# Patient Record
Sex: FEMALE | Race: OTHER
[De-identification: names, ages, dates, MRNs, and addresses within clinical notes are randomized per-mention and may not be internally consistent; named-entity substitution may affect disease eponyms.]

---

## 2017-08-31 ENCOUNTER — HOSPITAL ENCOUNTER (OUTPATIENT)
Dept: HOSPITAL 62 - RAD | Age: 32
End: 2017-08-31
Attending: NURSE PRACTITIONER
Payer: SELF-PAY

## 2017-08-31 DIAGNOSIS — Z34.83: Primary | ICD-10-CM

## 2017-08-31 PROCEDURE — 76805 OB US >/= 14 WKS SNGL FETUS: CPT

## 2017-08-31 NOTE — RADIOLOGY REPORT (SQ)
EXAM DESCRIPTION:  U/S OB 14+ TRNABD 1GES W/O DOP



COMPLETED DATE/TIME:  8/31/2017 2:35 pm



REASON FOR STUDY:  ENCOUNTER FOR SUPERVISION OF OTHER NORMAL PREGNANCY, SECOND TRIMESTER Z34.82  ENCO
UNTER FOR SUPRVSN OF NORMAL PREGNANCY, SECOND TRI



COMPARISON:  None.



TECHNIQUE:  Static and Dynamic grayscale imaging performed of gravid uterus using transabdominal appr
oach.  Additional selected color Doppler and spectral images recorded.  All stored on PACS.



LIMITATIONS:  None.



FINDINGS:  EGA:  29 weeks 48 weeks

NOAH: 12/25/2017

EFW:  1415+/- 209 grams

PERCENTILE:  Not calculated

VARGAS: 13.6 cm

PLACENTA:  Anterior  GRADE: I

FETAL PRESENTATION: Cephalic.

FETAL ANATOMY:

FETAL HEART RATE: 147 beats per minute.

FOUR CHAMBER HEART: Visualized.

THREE VESSEL CORD: Yes.

CORD INSERTION: Visualized.

KIDNEYS AND BLADDER: Renal pelves appear prominent, 1 greater than the other.

STOMACH: Visualized. Appears normal.

SPINE: Normal as visualized.

BRAIN AND LATERAL VENTRICLES: Lateral ventricles not seen. normal brain as visualized.

OTHER: No other significant finding.

MATERNAL ADNEXA: Maternal ovaries not visualized.

CERVICAL LENGTH: Not applicable. Greater than 20 weeks. Need transvaginal study if indicated.   Close
d.

OTHER: No other significant finding.



IMPRESSION:  1.  Live intrauterine gestation 29 weeks 4 days with an estimated date of delivery of 12
/25/2017.

2.  The renal pelves are prominent, 1 more so than the other.

Trimester of pregnancy: Third trimester - 28 weeks to delivery.



TECHNICAL DOCUMENTATION:  JOB ID:  [id number]

 2011 [employer]- All Rights Reserved

## 2017-11-16 ENCOUNTER — HOSPITAL ENCOUNTER (INPATIENT)
Dept: HOSPITAL 62 - LC | Age: 32
LOS: 2 days | Discharge: HOME | End: 2017-11-18
Attending: OBSTETRICS & GYNECOLOGY | Admitting: OBSTETRICS & GYNECOLOGY
Payer: MEDICAID

## 2017-11-16 DIAGNOSIS — D64.9: ICD-10-CM

## 2017-11-16 DIAGNOSIS — Z3A.40: ICD-10-CM

## 2017-11-16 DIAGNOSIS — O32.6XX0: ICD-10-CM

## 2017-11-16 DIAGNOSIS — J45.909: ICD-10-CM

## 2017-11-16 LAB
A1 MICROGLOB PLACENTAL VAG QL: POSITIVE
APPEARANCE UR: CLEAR
BARBITURATES UR QL SCN: NEGATIVE
BASOPHILS # BLD AUTO: 0.1 10^3/UL (ref 0–0.2)
BASOPHILS NFR BLD AUTO: 0.6 % (ref 0–2)
BILIRUB UR QL STRIP: NEGATIVE
EOSINOPHIL # BLD AUTO: 0.5 10^3/UL (ref 0–0.6)
EOSINOPHIL NFR BLD AUTO: 4.3 % (ref 0–6)
ERYTHROCYTE [DISTWIDTH] IN BLOOD BY AUTOMATED COUNT: 14.4 % (ref 11.5–14)
GLUCOSE UR STRIP-MCNC: NEGATIVE MG/DL
HCT VFR BLD CALC: 29 % (ref 36–47)
HGB BLD-MCNC: 9.4 G/DL (ref 12–15.5)
HGB HCT DIFFERENCE: -0.8
KETONES UR STRIP-MCNC: NEGATIVE MG/DL
LYMPHOCYTES # BLD AUTO: 1.7 10^3/UL (ref 0.5–4.7)
LYMPHOCYTES NFR BLD AUTO: 15.4 % (ref 13–45)
MCH RBC QN AUTO: 24.7 PG (ref 27–33.4)
MCHC RBC AUTO-ENTMCNC: 32.4 G/DL (ref 32–36)
MCV RBC AUTO: 76 FL (ref 80–97)
METHADONE UR QL SCN: NEGATIVE
MONOCYTES # BLD AUTO: 1 10^3/UL (ref 0.1–1.4)
MONOCYTES NFR BLD AUTO: 8.5 % (ref 3–13)
NEUTROPHILS # BLD AUTO: 8 10^3/UL (ref 1.7–8.2)
NEUTS SEG NFR BLD AUTO: 71.2 % (ref 42–78)
NITRITE UR QL STRIP: NEGATIVE
PCP UR QL SCN: NEGATIVE
PH UR STRIP: 6 [PH] (ref 5–9)
PROT UR STRIP-MCNC: NEGATIVE MG/DL
RBC # BLD AUTO: 3.81 10^6/UL (ref 3.72–5.28)
SP GR UR STRIP: 1.03
URINE OPIATES LOW: NEGATIVE
UROBILINOGEN UR-MCNC: NEGATIVE MG/DL (ref ?–2)
WBC # BLD AUTO: 11.2 10^3/UL (ref 4–10.5)

## 2017-11-16 PROCEDURE — 86901 BLOOD TYPING SEROLOGIC RH(D): CPT

## 2017-11-16 PROCEDURE — 4A1HXCZ MONITORING OF PRODUCTS OF CONCEPTION, CARDIAC RATE, EXTERNAL APPROACH: ICD-10-PCS | Performed by: ADVANCED PRACTICE MIDWIFE

## 2017-11-16 PROCEDURE — 86592 SYPHILIS TEST NON-TREP QUAL: CPT

## 2017-11-16 PROCEDURE — 85025 COMPLETE CBC W/AUTO DIFF WBC: CPT

## 2017-11-16 PROCEDURE — 86850 RBC ANTIBODY SCREEN: CPT

## 2017-11-16 PROCEDURE — 85027 COMPLETE CBC AUTOMATED: CPT

## 2017-11-16 PROCEDURE — 36415 COLL VENOUS BLD VENIPUNCTURE: CPT

## 2017-11-16 PROCEDURE — 81001 URINALYSIS AUTO W/SCOPE: CPT

## 2017-11-16 PROCEDURE — 86900 BLOOD TYPING SEROLOGIC ABO: CPT

## 2017-11-16 PROCEDURE — 84112 EVAL AMNIOTIC FLUID PROTEIN: CPT

## 2017-11-16 PROCEDURE — 80307 DRUG TEST PRSMV CHEM ANLYZR: CPT

## 2017-11-16 RX ADMIN — PENICILLIN G POTASSIUM SCH UNIT: 5000000 POWDER, FOR SOLUTION INTRAMUSCULAR; INTRAPLEURAL; INTRATHECAL; INTRAVENOUS at 16:16

## 2017-11-16 RX ADMIN — FERROUS SULFATE TAB 325 MG (65 MG ELEMENTAL FE) SCH MG: 325 (65 FE) TAB at 17:38

## 2017-11-16 RX ADMIN — IBUPROFEN SCH MG: 800 TABLET, FILM COATED ORAL at 22:10

## 2017-11-16 RX ADMIN — PENICILLIN G POTASSIUM SCH UNIT: 5000000 POWDER, FOR SOLUTION INTRAMUSCULAR; INTRAPLEURAL; INTRATHECAL; INTRAVENOUS at 17:07

## 2017-11-16 RX ADMIN — FAMOTIDINE SCH MG: 20 TABLET, FILM COATED ORAL at 22:09

## 2017-11-16 RX ADMIN — IBUPROFEN SCH MG: 800 TABLET, FILM COATED ORAL at 15:21

## 2017-11-16 RX ADMIN — DOCUSATE SODIUM SCH MG: 100 CAPSULE, LIQUID FILLED ORAL at 17:38

## 2017-11-16 NOTE — ADMISSION PHYSICAL
=================================================================



=================================================================

Datetime Report Generated by CPN: 2017 15:37

   

   

=================================================================

CURRENT ADMISSION

=================================================================

   

Chief Complaint:  Suspected Ruptured Membranes

Indication for Induction:  PROM

Indication for Induction:  Term, Intrauterine Pregnancy; Ruptured

   Membranes

Admit Plan:  Admit to Unit; Initiate Labor Induction Protocol

   

=================================================================

ALLERGIES

=================================================================

   

Medication Allergies:  No

Medication Allergies:  No Known Allergies (2017)

Medication Allergies:  No Known Allergies (2014)

Latex:  No Latex Allergies

Food Allergies:  N/A

   

=================================================================

OBSTETRICAL HISTORY

=================================================================

   

EDC:  2017 00:00

:  4

Para:  2

Term:  2

:  0

SAB:  1

IAB:  0

Ectopic:  0

Livin

Cesareans:  0

VBACs:  0

Multiple Births:  1

Gestational Diabetes:  No

Rh Sensitization:  No

Incompetent Cervix:  No

RAMONA:  No

Infertility:  No

ART Treatment:  No

Uterine Anomaly:  No

IUGR:  No

Hx Previous C/S:  No

Macrosomia:  No

Hx Loss/Stillborn:  No

PIH:  No

Hx  Death:  No

Placenta Previa/Abruption:  No

Depression/PP Depression:  No

PTL/PROM:  No

Post Partum Hemorrhage:  No

Current Pregnancy Procedures:  Ultrasound; NST

Obstetrical History Comments:  G1- 40.6-female-7lbs 6 oz

   G2-SAB around 10 weeks

   G3-Induced at 41.1-female-6lbs 14oz

   

=================================================================

***SEE PRENATAL RECORDS***

=================================================================

   

Alcohol:  No

Marijuana :  No

Cocaine:  No

Other Illicit Drugs:  No

Cigarettes:  Never Smoker. [phone]

   

=================================================================

MEDICAL HISTORY

=================================================================

   

Diabetes:  No

Blood Transfusion:  No

Pulmonary Disease (Asthma, TB):  Yes

Breast Disease:  No

Hypertension:  No

Gyn Surgery:  No

Heart Disease:  No

Hosp/Surgery:  No

Autoimmune Disorder:  No

Anesthetic Complications:  No

Kidney Disease:  No

Abnormal Pap Smear:  Yes

Neuro/Epilepsy:  No

Psychiatric Disorders:  No

Other Medical Diseases:  No

Hepatitis/Liver Disease:  No

Significant Family History:  No

Varicosities/Phlebitis:  No

Trauma/Violence :  No

Thyroid Dysfunction:  No

Medical History Comments:  childhood asthma

   

=================================================================

INFECTIOUS HISTORY

=================================================================

   

Gonorrhea:  No

Genital Herpes:  No

Chlamydia:  No

Tuberculosis:  No

Syphilis:  No

Hepatitis:  No

HIV/AIDS Exposure:  No

Rash or Viral Illness:  No

HPV:  Yes

   

=================================================================

PHYSICAL EXAM

=================================================================

   

General:  Normal

HEENT:  Normal

Neurologic:  Normal

Thyroid:  Normal

Heart:  Normal

Lungs:  Normal

Breast:  Deferred

Back:  Normal

Abdomen:  Normal

Genitourinary Exam:  Normal

Extremities:  Normal

DTRs:  Normal

Pelvic Type:  Adequate

Vital Signs:  Reviewed

   

=================================================================

VAGINAL EXAM

=================================================================

   

Dilatation:  2

Effacement:  60

Station:  -2

   

=================================================================

MEMBRANES

=================================================================

   

Pooling:  Positive

Membranes:  Ruptured

   

=================================================================

FETUS A

=================================================================

   

EGA:  40.4

Monitoring:  External US

FHR- Baseline:  120

Variability:  Moderate 6-25bpm

Accelerations:  15X15

Decelerations:  None

FHR Category:  Category I

Admit Comment:  Admit for pitocin

   

=================================================================

PLANS FOR LABOR AND DELIVERY

=================================================================

   

Labor and Delivery:  None

Pain Management:  None

Feeding Preference:  Breast

Benefit of Breast Feed Discussed:  Yes

Circumcision:  Yes

   

=================================================================

INFORMED CONSENT

=================================================================

   

Signature:  Electronically signed by MD Mita RiveraSMIDA) on

   2017 at 03:44  with User ID: [email or web address]

## 2017-11-17 LAB
ERYTHROCYTE [DISTWIDTH] IN BLOOD BY AUTOMATED COUNT: 14.8 % (ref 11.5–14)
HCT VFR BLD CALC: 28.4 % (ref 36–47)
HGB BLD-MCNC: 9.3 G/DL (ref 12–15.5)
HGB HCT DIFFERENCE: -0.5
MCH RBC QN AUTO: 25.2 PG (ref 27–33.4)
MCHC RBC AUTO-ENTMCNC: 32.9 G/DL (ref 32–36)
MCV RBC AUTO: 77 FL (ref 80–97)
RBC # BLD AUTO: 3.7 10^6/UL (ref 3.72–5.28)
WBC # BLD AUTO: 13.2 10^3/UL (ref 4–10.5)

## 2017-11-17 RX ADMIN — IBUPROFEN SCH MG: 800 TABLET, FILM COATED ORAL at 22:44

## 2017-11-17 RX ADMIN — Medication SCH CAP: at 10:21

## 2017-11-17 RX ADMIN — DOCUSATE SODIUM SCH MG: 100 CAPSULE, LIQUID FILLED ORAL at 17:58

## 2017-11-17 RX ADMIN — PENICILLIN G POTASSIUM SCH UNIT: 5000000 POWDER, FOR SOLUTION INTRAMUSCULAR; INTRAPLEURAL; INTRATHECAL; INTRAVENOUS at 00:44

## 2017-11-17 RX ADMIN — FERROUS SULFATE TAB 325 MG (65 MG ELEMENTAL FE) SCH MG: 325 (65 FE) TAB at 10:20

## 2017-11-17 RX ADMIN — IBUPROFEN SCH MG: 800 TABLET, FILM COATED ORAL at 05:07

## 2017-11-17 RX ADMIN — FERROUS SULFATE TAB 325 MG (65 MG ELEMENTAL FE) SCH MG: 325 (65 FE) TAB at 17:58

## 2017-11-17 RX ADMIN — DOCUSATE SODIUM SCH MG: 100 CAPSULE, LIQUID FILLED ORAL at 10:20

## 2017-11-17 RX ADMIN — FAMOTIDINE SCH MG: 20 TABLET, FILM COATED ORAL at 22:44

## 2017-11-17 RX ADMIN — IBUPROFEN SCH MG: 800 TABLET, FILM COATED ORAL at 13:43

## 2017-11-17 RX ADMIN — FAMOTIDINE SCH MG: 20 TABLET, FILM COATED ORAL at 10:21

## 2017-11-17 RX ADMIN — SENNOSIDES, DOCUSATE SODIUM SCH EACH: 50; 8.6 TABLET, FILM COATED ORAL at 10:21

## 2017-11-17 NOTE — DELIVERY SUMMARY
=================================================================

Del Sum A-C

=================================================================

Datetime Report Generated by CPN: 2017 14:15

   

   

=================================================================

DELIVERY PERSONNEL

=================================================================

   

DELIVERY PERSONNEL:  [id number]

Delivery Doctor::  Zuleyka Mares CNM

Nurse Midwife Certified::  Zuleyka Mares CNM

Labor and Delivery Nurse::  Jana Altamirano RN

Labor and Delivery Nurse::  Lissa Leach RN

Circulator::  SUSAN Caraballo

Nursery Nurse::  MELODY Cardenas RN

Nursery Nurse::  SATISH Hassan RN

Scrub Tech/CNA:  Delores Varner, CNA II

   

=================================================================

MATERNAL INFORMATION

=================================================================

   

Delivery Anesthesia:  Epidural

Medications After Delivery:  Pitocin Drip 20 Units/1000ml NSS

Estimated  Blood Loss (ml):  100

Maternal Complications:  None

Provider Comments:  Called into room pt had been pushing with rn and

   ready for delivery. VMI delivered with right compound hand.

   Spontaneous respiratory effort and cry at birth. Baby placed on

   maternal abdomen, cord allowed to stop pulsating then clamped x2 and

   cut (3vc noted). Placenta delivered spontaneously intact, vaginal

   and perineal inspections revealed no lacerations. Fundus firm @ U-2,

   bleeding stable. Mother and baby in room stable and remain skin to

   skin.

   

=================================================================

LABOR SUMMARY

=================================================================

   

EDC:  2017 00:00

No. Babies in Womb:  1

 Attempted:  No

Labor Anesthesia:  Epidural

   

=================================================================

LABOR INFORMATION

=================================================================

   

Reason for Induction:  Premature Rupture of Membranes

Onset of Labor:  2017 08:30

Complete Dilatation:  2017 13:04

Oxytocin:  Augmentation

Group B Beta Strep:  positive

Antibiotics # of Doses:  3

Antibiotics Time of Last Dose:  1018

Steroids Given:  None

Reason Steroids Not Administered:  Not Applicable

   

=================================================================

MEMBRANES

=================================================================

   

Membranes Rupture Method:  Spontaneous

Rupture of Membranes:  11/15/2017 22:00

Length of Rupture (hr):  15.40

Amniotic Fluid Color:  Clear

Amniotic Fluid Amount:  Scant

Amniotic Fluid Odor:  None

   

=================================================================

STAGES OF LABOR

=================================================================

   

Stage 1 hr:  4

Stage 1 min:  34

Stage 2 hr:  0

Stage 2 min:  20

Stage 3 hr:  0

Stage 3 min:  6

Total Time in Labor hr:  5

Total Time in Labor min:  0

   

=================================================================

VAGINAL DELIVERY

=================================================================

   

Episiotomy:  None

Laceration #1:  None

Laceration Extension #1:  N/A

Laceration #2:  None

Laceration #3:  None

Laceration Repair:  Not Applicable

Laceration Repair Note:  perineal abrasion-hemostatic no repair needed.

Sponge Count Correct:  N/A

   

=================================================================

CSECTION DELIVERY

=================================================================

   

Primary Indication:  N/A

Secondary Indication:  N/A

CSection Incidence:  N/A

Labor:  N/A

Elective:  N/A

CSection Incision:  N/A

   

=================================================================

BABY A INFORMATION

=================================================================

   

Infant Delivery Date/Time:  2017 13:24

Method of Delivery:  Vaginal

Born in Route :  No

:  N/A

Forceps:  N/A

Vacuum Extraction:  N/A

Shoulder Dystocia :  No

   

=================================================================

PRESENTATION/POSITION BABY A

=================================================================

   

Presentation:  Cephalic

Cephalic Presentation:  Vertex

Vertex Position:  Right Occipital Anterior

Breech Presentation:  N/A

   

=================================================================

PLACENTA INFORMATION BABY A

=================================================================

   

Placenta Delivery Time :  2017 13:30

Placenta Method of Delivery:  Spontaneous

Placenta Status:  Delivered

   

=================================================================

APGAR SCORES BABY A

=================================================================

   

Heart Rate 1 min:  >100 bpm

Resp Effort 1 min:  Good Cry

Reflex Irritability 1 min:  Cough or Sneeze or Pulls Away

Muscle Tone 1 min:  Active Motion

Color 1 min:  Body Pink, Extremities Blue

Resuscitation Effort 1 min:  Tactile Stimulation

APGAR SCORE 1 MIN:  9

Heart Rate 5 min:  >100 bpm

Resp Effort 5 min:  Good Cry

Reflex Irritability 5 min:  Cough or Sneeze or Pulls Away

Muscle Tone 5 min:  Active Motion

Color 5 min:  Body Pink, Extremities Blue

Resuscitation Effort 5 min:  Tactile Stimulation

APGAR SCORE 5 MIN:  9

   

=================================================================

INFANT INFORMATION BABY A

=================================================================

   

Gestational Age at Delivery:  40.4

Gestational Status:  Full Term- 39- 40.6 Weeks

Infant Outcome :  Liveborn

Infant Condition :  Stable

Infant Sex:  Male

   

=================================================================

IDENTIFICATION BABY A

=================================================================

   

Infant Verification Date/Time:  2017 13:50

ID Band Number:  [id number]

Mother's Name Verified:  Yes

Infant Medical Record Number:  [id number]

RN Verifying Infant:  MARQUITA Leach RN

Additional Verifying Personnel:  COOPER Altamirano RN

   

=================================================================

WEIGHT/LENGTH BABY A

=================================================================

   

Infant Birthweight (gm):  3480

Infant Weight (lb):  7

Infant Weight (oz):  11

Infant Length (in):  19.25

Infant Length (cm):  48.90

   

=================================================================

CORD INFORMATION BABY A

=================================================================

   

No. Cord Vessels:  3

Nuchal Cord :  N/A

Cord Blood Taken:  Yes-For Storage (Mom's Blood type +)

Infant Suction:  Mouth; Nose

   

=================================================================

ASSESSMENT BABY A

=================================================================

   

Infant Complications:  None

Physical Findings at Delivery:  Within Normal Limits

Physical Findings- Other:  Right compound hand

Infant Respirations:  Appears Normal

Skin to Skin:  Yes

Skin to Skin Time (min):  30

Neonatologist/ALS Called :  No

Infant Care By:  COOPER Cardenas RN

Transferred To:  Remains with Mother

   

=================================================================

BABY B INFORMATION

=================================================================

   

 :  N/A

   

=================================================================

SIGNATURES

=================================================================

   

Assignment:  Dominique Lantigua MD

Signature:  Electronically signed by Zuleyka Mares CNM on

   2017 at 13:40  with User ID: [email or web address]

:  Electronically signed by Zuleyka Mares CNM on 2017 at

   13:40  with User ID: [email or web address]

## 2017-11-17 NOTE — WARNING SIGNS IN BABIES
=================================================================

VOD Warning Signs

=================================================================

Datetime Report Generated by [facility]: 11/17/2017 14:17

   

VOD#608 -Warning Signs in Babies:  Needs to be viewed.    (11/17/2017

   14:14:Ana Lilia Nieves)

## 2017-11-17 NOTE — PDOC PROGRESS REPORT
Subjective-OB


Subjective: 


Post Delivery Day: 1








32 year old.  Denies any needs at this time, voiding without difficulty, 

tolerating diet, pain is well controlled.   








Physical Exam (OB)


Vital Signs: 


 











Temp Pulse Resp BP Pulse Ox


 


 97.8 F   84   18   103/65   100 


 


 11/17/17 08:00  11/17/17 08:00  11/17/17 08:00  11/17/17 08:00  11/17/17 08:00








 Intake & Output











 11/16/17 11/17/17 11/18/17





 06:59 06:59 06:59


 


Weight 56.7 kg  














- PIH/Pre-Eclampsia


DTR's: 1 +


Clonus: Negative


Headache: Absent


Epigastric Pain: No


Visual Changes: No





- Lochia


Lochia Amount: Scant < 10 ml


Lochia Color: Rubra/Red





- Abdomen


Description: Soft, Round


Hernia Present: No


Fundal Description: Firm, Midline


Fundal Height: u/u - u/2





Objective-Diagnostic


Laboratory: 


 





 11/17/17 07:29 





 











  11/17/17





  07:29


 


WBC  13.2 H


 


RBC  3.70 L


 


Hgb  9.3 L


 


Hct  28.4 L


 


MCV  77 L


 


MCH  25.2 L


 


MCHC  32.9


 


RDW  14.8 H


 


Plt Count  228














Assessment and Plan(PN)





- Assessment and Plan


(1) Delivery normal


Is this a current diagnosis for this admission?: Yes   


Plan: 


routine pp care








(2) Positive GBS test


Is this a current diagnosis for this admission?: Yes   


Plan: 


abx maternal temp








- Time Spent with Patient


Time with patient: Less than 15 minutes


Critical Time spent with patient: Less than 15 minutes


Medications reviewed and adjusted accordingly: Yes





- Disposition


Anticipated Discharge: Home


Within: within 24 hours

## 2017-11-18 VITALS — SYSTOLIC BLOOD PRESSURE: 103 MMHG | DIASTOLIC BLOOD PRESSURE: 65 MMHG

## 2017-11-18 RX ADMIN — SENNOSIDES, DOCUSATE SODIUM SCH EACH: 50; 8.6 TABLET, FILM COATED ORAL at 10:07

## 2017-11-18 RX ADMIN — FAMOTIDINE SCH MG: 20 TABLET, FILM COATED ORAL at 10:07

## 2017-11-18 RX ADMIN — FERROUS SULFATE TAB 325 MG (65 MG ELEMENTAL FE) SCH MG: 325 (65 FE) TAB at 10:07

## 2017-11-18 RX ADMIN — DOCUSATE SODIUM SCH MG: 100 CAPSULE, LIQUID FILLED ORAL at 10:07

## 2017-11-18 RX ADMIN — IBUPROFEN SCH MG: 800 TABLET, FILM COATED ORAL at 06:34

## 2017-11-18 RX ADMIN — Medication SCH CAP: at 10:07

## 2017-11-18 NOTE — PDOC DISCHARGE SUMMARY
Final Diagnosis


Discharge Date: 11/18/17





- Final Diagnosis


(1) Delivery normal


Is this a current diagnosis for this admission?: Yes   





(2) Positive GBS test


Is this a current diagnosis for this admission?: Yes   





(3) Anemia


Is this a current diagnosis for this admission?: Yes   





Discharge Data





- Discharge Medication


Home Medications: 








Pnv,Calcium 72/Iron/Folic Acid [Pnv Prenatal Plus Multivit Tab] 1 each PO DAILY 

02/21/16 


Docusate Sodium [Colace 100 mg Capsule] 100 mg PO BID #60 capsule 11/18/17 


Ferrous Sulfate [Feosol 325 mg Tablet] 325 mg PO BID #60 tablet 11/18/17 


Ibuprofen [Motrin 800 mg Tablet] 800 mg PO Q8 #60 tablet 11/18/17 








Gestational Age: 40.4


Reason(s) for Admission: Onset of Labor, Group B Strep Positive


Prenatal Procedures: NST


Intrapartum Procedure(s): Spontaneous Vaginal Delivery





- Diagnosis Test


Laboratory: 


 











Temp Pulse Resp BP Pulse Ox


 


 98.5 F   82   15   113/62   100 


 


 11/18/17 07:38  11/18/17 07:38  11/18/17 07:38  11/18/17 07:38  11/18/17 07:38








 











  11/16/17 11/16/17 11/17/17





  00:52 02:12 07:29


 


RBC   3.81  3.70 L


 


Hgb   9.4 L  9.3 L


 


Hct   29.0 L  28.4 L


 


Urine Opiates Screen  NEGATIVE  














- Discharge information/Instructions


Discharge Activity: Activity As Tolerated, Pelvic Rest, No tub bath


Discharge Diet: Regular


Disposition: HOME, SELF-CARE


Follow up with: [facility]


in: 4, Weeks

## 2019-07-19 ENCOUNTER — HOSPITAL ENCOUNTER (INPATIENT)
Dept: HOSPITAL 62 - LR | Age: 34
LOS: 2 days | Discharge: HOME | End: 2019-07-21
Attending: OBSTETRICS & GYNECOLOGY | Admitting: OBSTETRICS & GYNECOLOGY
Payer: SELF-PAY

## 2019-07-19 DIAGNOSIS — Z3A.41: ICD-10-CM

## 2019-07-19 DIAGNOSIS — O48.0: Primary | ICD-10-CM

## 2019-07-19 DIAGNOSIS — D50.9: ICD-10-CM

## 2019-07-19 DIAGNOSIS — D25.9: ICD-10-CM

## 2019-07-19 DIAGNOSIS — O34.13: ICD-10-CM

## 2019-07-19 LAB
ADD MANUAL DIFF: NO
BARBITURATES UR QL SCN: NEGATIVE
BASOPHILS # BLD AUTO: 0.1 10^3/UL (ref 0–0.2)
BASOPHILS NFR BLD AUTO: 0.8 % (ref 0–2)
EOSINOPHIL # BLD AUTO: 0.2 10^3/UL (ref 0–0.6)
EOSINOPHIL NFR BLD AUTO: 2.5 % (ref 0–6)
ERYTHROCYTE [DISTWIDTH] IN BLOOD BY AUTOMATED COUNT: 15.4 % (ref 11.5–14)
HCT VFR BLD CALC: 31.1 % (ref 36–47)
HGB BLD-MCNC: 10.4 G/DL (ref 12–15.5)
LYMPHOCYTES # BLD AUTO: 1.3 10^3/UL (ref 0.5–4.7)
LYMPHOCYTES NFR BLD AUTO: 15.4 % (ref 13–45)
MCH RBC QN AUTO: 25.8 PG (ref 27–33.4)
MCHC RBC AUTO-ENTMCNC: 33.4 G/DL (ref 32–36)
MCV RBC AUTO: 77 FL (ref 80–97)
METHADONE UR QL SCN: NEGATIVE
MONOCYTES # BLD AUTO: 0.5 10^3/UL (ref 0.1–1.4)
MONOCYTES NFR BLD AUTO: 5.9 % (ref 3–13)
NEUTROPHILS # BLD AUTO: 6.2 10^3/UL (ref 1.7–8.2)
NEUTS SEG NFR BLD AUTO: 75.4 % (ref 42–78)
PCP UR QL SCN: NEGATIVE
PLATELET # BLD: 205 10^3/UL (ref 150–450)
RBC # BLD AUTO: 4.01 10^6/UL (ref 3.72–5.28)
TOTAL CELLS COUNTED % (AUTO): 100 %
URINE AMPHETAMINES SCREEN: NEGATIVE
URINE BENZODIAZEPINES SCREEN: NEGATIVE
URINE COCAINE SCREEN: NEGATIVE
URINE MARIJUANA (THC) SCREEN: NEGATIVE
WBC # BLD AUTO: 8.3 10^3/UL (ref 4–10.5)

## 2019-07-19 PROCEDURE — 86901 BLOOD TYPING SEROLOGIC RH(D): CPT

## 2019-07-19 PROCEDURE — 3E033VJ INTRODUCTION OF OTHER HORMONE INTO PERIPHERAL VEIN, PERCUTANEOUS APPROACH: ICD-10-PCS | Performed by: OBSTETRICS & GYNECOLOGY

## 2019-07-19 PROCEDURE — 36415 COLL VENOUS BLD VENIPUNCTURE: CPT

## 2019-07-19 PROCEDURE — 85027 COMPLETE CBC AUTOMATED: CPT

## 2019-07-19 PROCEDURE — 86850 RBC ANTIBODY SCREEN: CPT

## 2019-07-19 PROCEDURE — 86900 BLOOD TYPING SEROLOGIC ABO: CPT

## 2019-07-19 PROCEDURE — 80307 DRUG TEST PRSMV CHEM ANLYZR: CPT

## 2019-07-19 PROCEDURE — 85025 COMPLETE CBC W/AUTO DIFF WBC: CPT

## 2019-07-19 PROCEDURE — 10907ZC DRAINAGE OF AMNIOTIC FLUID, THERAPEUTIC FROM PRODUCTS OF CONCEPTION, VIA NATURAL OR ARTIFICIAL OPENING: ICD-10-PCS | Performed by: OBSTETRICS & GYNECOLOGY

## 2019-07-19 PROCEDURE — 86592 SYPHILIS TEST NON-TREP QUAL: CPT

## 2019-07-19 PROCEDURE — 4A1HXCZ MONITORING OF PRODUCTS OF CONCEPTION, CARDIAC RATE, EXTERNAL APPROACH: ICD-10-PCS | Performed by: OBSTETRICS & GYNECOLOGY

## 2019-07-19 RX ADMIN — PENICILLIN G POTASSIUM SCH MLS/HR: 5000000 POWDER, FOR SOLUTION INTRAMUSCULAR; INTRAPLEURAL; INTRATHECAL; INTRAVENOUS at 20:15

## 2019-07-19 NOTE — ADMISSION PHYSICAL
=================================================================



=================================================================

Datetime Report Generated by CPN: 2019 15:49

   

   

=================================================================

CURRENT ADMISSION

=================================================================

   

Chief Complaint:  Scheduled Induction of Labor

Indication for Induction:  Post Dates

Admit Impression :  Postterm, Intrauterine Pregnancy; Intact Membranes;

   Induction of Labor

Admit Plan:  Admit to Unit; Initiate Labor Induction Protocol

Admit Plan- Other:  Start PCN for GBS + status

   

=================================================================

ALLERGIES

=================================================================

   

Medication Allergies:  No

Medication Allergies:  No Known Allergies (2019)

Latex:  No Latex Allergies

   

=================================================================

OBSTETRICAL HISTORY

=================================================================

   

:  5

Para:  3

Gestational Diabetes:  No

Rh Sensitization:  No

Incompetent Cervix:  No

RAMONA:  No

Infertility:  No

ART Treatment:  No

Uterine Anomaly:  No

IUGR:  No

Hx Previous C/S:  No

Macrosomia:  No

Hx Loss/Stillborn:  No

PIH:  No

Hx  Death:  No

Placenta Previa/Abruption:  No

Depression/PP Depression:  No

PTL/PROM:  No

Post Partum Hemorrhage:  No

Current Pregnancy Procedures:  Ultrasound

Obstetrical History Comments:  G1- , NVD AT 40. 6

   G2- Miscarriage at 12 weeks 

   G3- 2016, NVD AT 41.1

   G4- 2017, NVD AT 40.4

   G5- Current Pregnancy

      

   

=================================================================

***SEE PRENATAL RECORDS***

=================================================================

   

Alcohol:  No

Marijuana :  No

Cocaine:  No

Other Illicit Drugs:  No

Cigarettes:  Never Smoker. [phone]

   

=================================================================

MEDICAL HISTORY

=================================================================

   

Diabetes:  No

Blood Transfusion:  No

Pulmonary Disease (Asthma, TB):  No

Breast Disease:  No

Hypertension:  No

Gyn Surgery:  No

Heart Disease:  No

Hosp/Surgery:  No

Autoimmune Disorder:  No

Anesthetic Complications:  No

Kidney Disease:  No

Abnormal Pap Smear:  No

Neuro/Epilepsy:  No

Psychiatric Disorders:  No

Other Medical Diseases:  No

Hepatitis/Liver Disease:  No

Significant Family History:  No

Varicosities/Phlebitis:  No

Trauma/Violence :  No

Thyroid Dysfunction:  No

   

=================================================================

INFECTIOUS HISTORY

=================================================================

   

Gonorrhea:  No

Genital Herpes:  No

Chlamydia:  No

Tuberculosis:  No

Syphilis:  No

Hepatitis:  No

HIV/AIDS Exposure:  No

Rash or Viral Illness:  No

HPV:  No

   

=================================================================

PHYSICAL EXAM

=================================================================

   

General:  Normal

HEENT:  Normal

Neurologic:  Normal

Thyroid:  Normal

Heart:  Normal

Lungs:  Normal

Breast:  Normal

Back:  Normal

Abdomen:  Normal

Genitourinary Exam:  Normal

Extremities:  Normal

DTRs:  Normal

Pelvic Type:  Adequate

Vital Signs:  Reviewed; Within Normal Limits

   

=================================================================

VAGINAL EXAM

=================================================================

   

Dilatation:  6

Effacement:  80

Station:  0

   

=================================================================

MEMBRANES

=================================================================

   

Membranes:  Intact

   

=================================================================

FETUS A

=================================================================

   

Monitoring:  External US

FHR- Baseline:  130

Variability:  Moderate 6-25bpm

Accelerations:  15X15

Decelerations:  None

FHR Category:  Category I

Admit Comment:   here for IOL at 41.1 wks.  Doing well today, no

   c/o SROM or bleeding.  GBS+ status. VE n admission /0, vtx, 

   with irregular contractions. Pt is unsure if she wants an epidural

   or not, she is currently comfortable. EFW 8+11 on ultrasound earlier

   this week.  Will admit, start PCN 

   prophyaxis and routine Pitocin. Attending MD is Dr Heaton

   

=================================================================

PLANS FOR LABOR AND DELIVERY

=================================================================

   

Labor and Delivery:  None

Pain Management:  Natural

Feeding Preference:  Breast

Benefit of Breast Feed Discussed:  Yes

Circumcision:  N/A

   

=================================================================

INFORMED CONSENT

=================================================================

   

Assignment:  Prem Heaton MD

Signature:  Electronically signed by Irma Issa CNM on 2019

   at 15:48  with User ID: [email or web address]

:  Electronically signed by Irma Issa CNM on 2019 at 15:48 

   with User ID: [email or web address]

## 2019-07-20 LAB
ERYTHROCYTE [DISTWIDTH] IN BLOOD BY AUTOMATED COUNT: 15.5 % (ref 11.5–14)
HCT VFR BLD CALC: 26.3 % (ref 36–47)
HGB BLD-MCNC: 8.7 G/DL (ref 12–15.5)
MCH RBC QN AUTO: 25.7 PG (ref 27–33.4)
MCHC RBC AUTO-ENTMCNC: 33.1 G/DL (ref 32–36)
MCV RBC AUTO: 78 FL (ref 80–97)
PLATELET # BLD: 204 10^3/UL (ref 150–450)
RBC # BLD AUTO: 3.37 10^6/UL (ref 3.72–5.28)
WBC # BLD AUTO: 12.8 10^3/UL (ref 4–10.5)

## 2019-07-20 RX ADMIN — SENNOSIDES, DOCUSATE SODIUM SCH: 50; 8.6 TABLET, FILM COATED ORAL at 10:06

## 2019-07-20 RX ADMIN — PENICILLIN G POTASSIUM SCH: 5000000 POWDER, FOR SOLUTION INTRAMUSCULAR; INTRAPLEURAL; INTRATHECAL; INTRAVENOUS at 07:46

## 2019-07-20 RX ADMIN — Medication SCH CAP: at 10:04

## 2019-07-20 RX ADMIN — DOCUSATE SODIUM SCH MG: 100 CAPSULE, LIQUID FILLED ORAL at 17:44

## 2019-07-20 RX ADMIN — FERROUS SULFATE TAB 325 MG (65 MG ELEMENTAL FE) SCH MG: 325 (65 FE) TAB at 17:44

## 2019-07-20 RX ADMIN — IBUPROFEN SCH MG: 800 TABLET, FILM COATED ORAL at 14:14

## 2019-07-20 RX ADMIN — FERROUS SULFATE TAB 325 MG (65 MG ELEMENTAL FE) SCH MG: 325 (65 FE) TAB at 10:04

## 2019-07-20 RX ADMIN — FAMOTIDINE SCH: 20 TABLET, FILM COATED ORAL at 10:06

## 2019-07-20 RX ADMIN — FAMOTIDINE SCH MG: 20 TABLET, FILM COATED ORAL at 21:24

## 2019-07-20 RX ADMIN — IBUPROFEN SCH MG: 800 TABLET, FILM COATED ORAL at 05:34

## 2019-07-20 RX ADMIN — IBUPROFEN SCH MG: 800 TABLET, FILM COATED ORAL at 21:24

## 2019-07-20 RX ADMIN — DOCUSATE SODIUM SCH MG: 100 CAPSULE, LIQUID FILLED ORAL at 10:04

## 2019-07-20 NOTE — PDOC PROGRESS REPORT
Subjective-OB


Progress Note for:: 19 - PP Day #1, doing well, no complaints, ambulating 

w/out assistance, breastfeeding, A+, Rubella Immune





Physical Exam (OB)


Vital Signs: 


                                        











Temp Pulse Resp BP Pulse Ox


 


 98.3 F   87   16   103/56 L  100 


 


 19 07:52  19 07:52  19 07:52  19 07:52  19 00:30








                                 Intake & Output











 19





 06:59 06:59 06:59


 


Weight  55.3 kg 














- General


General Appearance: Appears well, Alert


In distress: None





- Lochia


Lochia Amount: Small 10-25 ml


Lochia Color: Rubra/Red





- Abdomen


Description: Soft, Round


Hernia Present: No


Fundal Height: u/u - u/2





- Respiratory


Respiratory Status: No respiratory distress





- Abdominal


Inspection: Normal


Distension: No distension





- Genitourinary


Genitourinary Note: 





voiding





- Extremities


Upper extremity: Normal inspection


Lower extremities: Normal inspection





- Neurological


Cognition: Normal


Orientation: AAOx4





- Psychological


Associated symptoms: Normal affect, Normal mood





- Skin


Skin Temperature: Warm


Skin Moisture: Dry





Objective-Diagnostic


Laboratory: 


                                        





                                 19 06:47 





                                        











  19





  15:22 15:22 06:47


 


WBC  8.3   12.8 H


 


RBC  4.01   3.37 L


 


Hgb  10.4 L   8.7 L


 


Hct  31.1 L   26.3 L


 


MCV  77 L   78 L


 


MCH  25.8 L   25.7 L


 


MCHC  33.4   33.1


 


RDW  15.4 H   15.5 H


 


Plt Count  205   204


 


Seg Neutrophils %  75.4  


 


Lymphocytes %  15.4  


 


Monocytes %  5.9  


 


Eosinophils %  2.5  


 


Basophils %  0.8  


 


Absolute Neutrophils  6.2  


 


Absolute Lymphocytes  1.3  


 


Absolute Monocytes  0.5  


 


Absolute Eosinophils  0.2  


 


Absolute Basophils  0.1  


 


Blood Type   A POSITIVE 


 


Antibody Screen   NEGATIVE 














Assessment and Plan(PN)





- Assessment and Plan


(1) Abnormal  AFP screen


Is this a current diagnosis for this admission?: Yes   





(2) Anemia


Qualifiers: 


   Anemia type: iron deficiency 


Is this a current diagnosis for this admission?: Yes   





(3) Delivery normal


Is this a current diagnosis for this admission?: Yes   





(4) Positive GBS test


Is this a current diagnosis for this admission?: Yes   





(5) Pregnancy


Qualifiers: 


   Weeks of gestation: 41 weeks   Qualified Code(s): Z3A.41 - 41 weeks gestation

of pregnancy   


Is this a current diagnosis for this admission?: Yes   





(6) Uterine fibroid


Qualifiers: 


   Uterine leiomyoma location: unspecified location   Qualified Code(s): D25.9 -

Leiomyoma of uterus, unspecified   


Is this a current diagnosis for this admission?: Yes   





- Time Spent with Patient


Time with patient: Less than 15 minutes


Medications reviewed and adjusted accordingly: Yes





- Disposition


Anticipated Discharge: Home


Within: within 24 hours

## 2019-07-21 VITALS — DIASTOLIC BLOOD PRESSURE: 66 MMHG | SYSTOLIC BLOOD PRESSURE: 113 MMHG

## 2019-07-21 RX ADMIN — DOCUSATE SODIUM SCH MG: 100 CAPSULE, LIQUID FILLED ORAL at 10:36

## 2019-07-21 RX ADMIN — IBUPROFEN SCH MG: 800 TABLET, FILM COATED ORAL at 05:51

## 2019-07-21 RX ADMIN — SENNOSIDES, DOCUSATE SODIUM SCH EACH: 50; 8.6 TABLET, FILM COATED ORAL at 10:36

## 2019-07-21 RX ADMIN — FERROUS SULFATE TAB 325 MG (65 MG ELEMENTAL FE) SCH MG: 325 (65 FE) TAB at 10:36

## 2019-07-21 RX ADMIN — IBUPROFEN SCH MG: 800 TABLET, FILM COATED ORAL at 14:37

## 2019-07-21 RX ADMIN — FAMOTIDINE SCH MG: 20 TABLET, FILM COATED ORAL at 10:36

## 2019-07-21 RX ADMIN — Medication SCH CAP: at 10:36

## 2019-07-21 NOTE — PDOC DISCHARGE SUMMARY
Final Diagnosis


Discharge Date: 19 -  Day #2, doing well, no complaints, breastfeeding, 

hx anemia, A+, Rubella immune





- Final Diagnosis


(1) Abnormal  AFP screen


Is this a current diagnosis for this admission?: Yes   





(2) Anemia


Is this a current diagnosis for this admission?: Yes   





(3) Delivery normal


Is this a current diagnosis for this admission?: Yes   





(4) Positive GBS test


Is this a current diagnosis for this admission?: Yes   





(5) Pregnancy


Is this a current diagnosis for this admission?: Yes   





(6) Uterine fibroid


Is this a current diagnosis for this admission?: Yes   





Discharge Data





- Discharge Medication


Prescriptions: 


Ferrous Sulfate [Feosol 325 mg Tablet] 325 mg PO BID #60 tablet


Ibuprofen [Motrin 800 mg Tablet] 800 mg PO Q8 #60 tablet


Home Medications: 








Pnv,Calcium 72/Iron/Folic Acid [Pnv Prenatal Plus Multivit Tab] 1 each PO DAILY 

16 


Ferrous Sulfate [Feosol 325 mg Tablet] 325 mg PO BID #60 tablet 19 


Ibuprofen [Motrin 800 mg Tablet] 800 mg PO Q8 #60 tablet 19 








Reason(s) for Admission: Induction of Labor


Prenatal Procedures: Ultrasound


Intrapartum Procedure(s): Spontaneous Vaginal Delivery





- Diagnosis Test


Laboratory: 


                                        











Temp Pulse Resp BP Pulse Ox


 


 98.2 F   83   16   113/66   100 


 


 19 08:00  19 08:00  19 08:00  19 08:00  19 08:00








                                        











  19





  15:00 15:22 06:47


 


RBC   4.01  3.37 L


 


Hgb   10.4 L  8.7 L


 


Hct   31.1 L  26.3 L


 


Urine Opiates Screen  NEGATIVE  














- Discharge information/Instructions


Discharge Activity: Activity As Tolerated, No Lifting Over 10 Pounds, Pelvic 

Rest, Slowly Increase Activity


Discharge Diet: As Tolerated, Regular


Disposition: HOME, SELF-CARE


Follow up with: [facility]


in: 4, Weeks - with the Health Dept

## 2019-07-30 NOTE — DELIVERY SUMMARY
=================================================================

Del Sum A-C

=================================================================

Datetime Report Generated by CPN: 2019 09:29

   

   

=================================================================

DELIVERY PERSONNEL

=================================================================

   

DELIVERY PERSONNEL:  [id number]

Delivery Doctor::  Prem Heaton MD

Labor and Delivery Nurse::  Teresita Issa RN

Labor and Delivery Nurse::  Radha Aguilera RN

Nursery Nurse::  Lovely Padilla RN

Nursery Nurse::  Asya Huerta RN

Scrub Tech/CNA:  Kimmy Ramirez, ST

   

=================================================================

MATERNAL INFORMATION

=================================================================

   

Delivery Anesthesia:  None

Medications After Delivery:  Pitocin Drip 20 Units/1000ml NSS; Cytotec

   1000mcg Per Rectum/Vagina

Maternal Complications:  None

   

=================================================================

LABOR SUMMARY

=================================================================

   

EDC:  2019 00:00

No. Babies in Womb:  1

 Attempted:  No

Labor Anesthesia:  None

   

=================================================================

LABOR INFORMATION

=================================================================

   

Reason for Induction:  Post Dates

Onset of Labor:  2019 15:40

Complete Dilatation:  2019 21:42

Oxytocin:  Induction

Group B Beta Strep:  Positive

Antibiotics # of Doses:  2

Antibiotics Time of Last Dose:  

Name of Antibiotic Given:  penicillin

Steroids Given:  None

Reason Steroids Not Administered:  Not Applicable

   

=================================================================

MEMBRANES

=================================================================

   

Membranes Rupture Method:  Artificial

Rupture of Membranes:  2019 20:20

Length of Rupture (hr):  1.53

Amniotic Fluid Color:  Moderate Meconium

Amniotic Fluid Amount:  Small

Amniotic Fluid Odor:  Normal

   

=================================================================

STAGES OF LABOR

=================================================================

   

Stage 1 hr:  6

Stage 1 min:  2

Stage 2 hr:  0

Stage 2 min:  10

Stage 3 hr:  0

Stage 3 min:  6

Total Time in Labor hr:  6

Total Time in Labor min:  18

   

=================================================================

VAGINAL DELIVERY

=================================================================

   

Episiotomy:  None

Laceration #1:  None

Laceration Extension #1:  N/A

Laceration #2:  None

Laceration Extension #2:  N/A

Laceration #3:  None

Laceration Extension #3:  N/A

Laceration Repair:  Not Applicable

Sharps Count Correct:  N/A

   

=================================================================

CSECTION DELIVERY

=================================================================

   

Primary Indication:  N/A

Secondary Indication:  N/A

CSection Incidence:  N/A

Labor:  N/A

Elective:  N/A

CSection Incision:  N/A

   

=================================================================

BABY A INFORMATION

=================================================================

   

Infant Delivery Date/Time:  2019 21:52

Method of Delivery:  Vaginal

Born in Route :  No

:  N/A

Forceps:  N/A

Vacuum Extraction:  N/A

Shoulder Dystocia :  No

   

=================================================================

PRESENTATION/POSITION BABY A

=================================================================

   

Presentation:  Cephalic

Cephalic Presentation:  Vertex

Vertex Position:  Left Occipital Anterior

Breech Presentation:  N/A

   

=================================================================

PLACENTA INFORMATION BABY A

=================================================================

   

Placenta Delivery Time :  2019 21:58

Placenta Method of Delivery:  Expressed

Placenta Method of Delivery:  Spontaneous

Placenta Status:  Delivered

   

=================================================================

APGAR SCORES BABY A

=================================================================

   

Heart Rate 1 min:  >100 bpm

Resp Effort 1 min:  Good Cry

Reflex Irritability 1 min:  Cough or Sneeze or Pulls Away

Muscle Tone 1 min:  Active Motion

Color 1 min:  Body Pink, Extremities Blue

APGAR SCORE 1 MIN:  9

Heart Rate 5 min:  >100 bpm

Resp Effort 5 min:  Good Cry

Reflex Irritability 5 min:  Cough or Sneeze or Pulls Away

Muscle Tone 5 min:  Active Motion

Color 5 min:  Body Pink, Extremities Blue

APGAR SCORE 5 MIN:  9

   

=================================================================

INFANT INFORMATION BABY A

=================================================================

   

Gestational Age at Delivery:  41.1

Gestational Status:  Late Term-  41- 41.6 Weeks

Infant Outcome :  Liveborn

Infant Condition :  Stable

Infant Sex:  Female

   

=================================================================

IDENTIFICATION BABY A

=================================================================

   

Infant Verification Date/Time:  2019 22:34

ID Band Number:  [id number]

Mother's Name Verified:  Yes

Infant Medical Record Number:  [id number]

RN Verifying Infant:  RAKESH Issa/ RUPESH Mathis

   

=================================================================

WEIGHT/LENGTH BABY A

=================================================================

   

Infant Birthweight (gm):  3448

Infant Weight (lb):  7

Infant Weight (oz):  10

Infant Length (in):  20.00

Infant Length (cm):  50.80

   

=================================================================

CORD INFORMATION BABY A

=================================================================

   

No. Cord Vessels:  3

Nuchal Cord :  N/A

Infant Suction:  Mouth; Nose

   

=================================================================

ASSESSMENT BABY A

=================================================================

   

Infant Complications:  None

Physical Findings at Delivery:  Within Normal Limits

Skin to Skin:  Yes

Transferred To:  Remains with Mother

   

=================================================================

BABY B INFORMATION

=================================================================

   

 :  N/A

   

=================================================================

SIGNATURES

=================================================================

   

Signature:  Electronically signed by Prem Heaton MD ([email or web address]) on

   2019 at 22:03  with User ID: [email or web address]